# Patient Record
Sex: FEMALE | Race: BLACK OR AFRICAN AMERICAN | NOT HISPANIC OR LATINO | URBAN - METROPOLITAN AREA
[De-identification: names, ages, dates, MRNs, and addresses within clinical notes are randomized per-mention and may not be internally consistent; named-entity substitution may affect disease eponyms.]

---

## 2021-12-22 ENCOUNTER — WEB ENCOUNTER (OUTPATIENT)
Dept: URBAN - METROPOLITAN AREA CLINIC 115 | Facility: CLINIC | Age: 61
End: 2021-12-22

## 2021-12-22 ENCOUNTER — OFFICE VISIT (OUTPATIENT)
Dept: URBAN - METROPOLITAN AREA CLINIC 115 | Facility: CLINIC | Age: 61
End: 2021-12-22
Payer: COMMERCIAL

## 2021-12-22 ENCOUNTER — LAB OUTSIDE AN ENCOUNTER (OUTPATIENT)
Dept: URBAN - METROPOLITAN AREA CLINIC 115 | Facility: CLINIC | Age: 61
End: 2021-12-22

## 2021-12-22 DIAGNOSIS — R10.84 ABDOMINAL CRAMPING, GENERALIZED: ICD-10-CM

## 2021-12-22 DIAGNOSIS — R16.0 LIVER MASS, RIGHT LOBE: ICD-10-CM

## 2021-12-22 PROBLEM — 300332007: Status: ACTIVE | Noted: 2021-12-22

## 2021-12-22 PROCEDURE — 99203 OFFICE O/P NEW LOW 30 MIN: CPT | Performed by: INTERNAL MEDICINE

## 2021-12-22 PROCEDURE — 99243 OFF/OP CNSLTJ NEW/EST LOW 30: CPT | Performed by: INTERNAL MEDICINE

## 2021-12-22 NOTE — HPI-TODAY'S VISIT:
Patient is here accompanied by her  for evaluation of having right flank pain with abnormal CT scan showing lesion in the liver.  Patient stated she has been having off and on for years right flank pain however recently her pain was intense and hence went to the emergency room had a CT scan of the abdomen pelvis that showed 4.2 x 3.6 cm low attenuated lesion in the left lobe of the liver.  Patient denies any heartburn denies any current constipation.  Reports having colonoscopy about 4 years ago which was unremarkable.  Patient denies any chest pain shortness of breath.

## 2022-05-13 ENCOUNTER — LAB OUTSIDE AN ENCOUNTER (OUTPATIENT)
Dept: URBAN - METROPOLITAN AREA CLINIC 115 | Facility: CLINIC | Age: 62
End: 2022-05-13

## 2022-05-13 ENCOUNTER — OFFICE VISIT (OUTPATIENT)
Dept: URBAN - METROPOLITAN AREA CLINIC 115 | Facility: CLINIC | Age: 62
End: 2022-05-13
Payer: COMMERCIAL

## 2022-05-13 ENCOUNTER — WEB ENCOUNTER (OUTPATIENT)
Dept: URBAN - METROPOLITAN AREA CLINIC 115 | Facility: CLINIC | Age: 62
End: 2022-05-13

## 2022-05-13 DIAGNOSIS — R10.84 ABDOMINAL PAIN, GENERALIZED: ICD-10-CM

## 2022-05-13 DIAGNOSIS — R16.0 LIVER MASS, LEFT LOBE: ICD-10-CM

## 2022-05-13 DIAGNOSIS — K21.9 GASTROESOPHAGEAL REFLUX DISEASE, UNSPECIFIED WHETHER ESOPHAGITIS PRESENT: ICD-10-CM

## 2022-05-13 PROBLEM — 162050009: Status: ACTIVE | Noted: 2022-05-13

## 2022-05-13 PROBLEM — 235595009: Status: ACTIVE | Noted: 2022-05-13

## 2022-05-13 PROCEDURE — 99214 OFFICE O/P EST MOD 30 MIN: CPT | Performed by: INTERNAL MEDICINE

## 2022-05-13 NOTE — HPI-TODAY'S VISIT:
Patient is here  for evaluation of having right flank pain with abnormal CT scan showing lesion in the liver.  Patient stated she has been having off and on for years right flank pain however recently her pain was intense and hence went to the emergency room had a CT scan of the abdomen pelvis that showed 4.2 x 3.6 cm low attenuated lesion in the left lobe of the liver.  Patient denies any heartburn denies any current constipation.  Reports having colonoscopy about 4 years ago which was unremarkable.  Patient denies any chest pain shortness of breath.  5/13/22 : Ms. Hopson is a 61-year-old female came to the office with complaints of having right upper quadrant abdominal discomfort which is burning sometimes dull aching.  Patient was still concerned about the questionable mass that was seen on the previous CT scan however it was not seen on the MRI of the abdomen which was done in December 2021.  Patient stated her bowel movements are every other day she had discomfort is not related with food intake.  Denies any chest pain shortness of breath.  Last colonoscopy was in 2017.  Patient was concerned about having cancer

## 2022-05-14 LAB
A/G RATIO: 1.6
ALBUMIN: 4.5
ALKALINE PHOSPHATASE: 95
ALT (SGPT): 13
AST (SGOT): 19
BILIRUBIN, TOTAL: 0.8
BUN/CREATININE RATIO: 19
BUN: 15
CALCIUM: 9.8
CARBON DIOXIDE, TOTAL: 24
CHLORIDE: 103
CREATININE: 0.79
EGFR: 85
GLOBULIN, TOTAL: 2.9
GLUCOSE: 110
POTASSIUM: 4.3
PROTEIN, TOTAL: 7.4
SODIUM: 139

## 2022-06-15 ENCOUNTER — WEB ENCOUNTER (OUTPATIENT)
Dept: URBAN - METROPOLITAN AREA CLINIC 115 | Facility: CLINIC | Age: 62
End: 2022-06-15

## 2022-11-09 ENCOUNTER — DASHBOARD ENCOUNTERS (OUTPATIENT)
Age: 62
End: 2022-11-09

## 2022-11-17 ENCOUNTER — OFFICE VISIT (OUTPATIENT)
Dept: URBAN - METROPOLITAN AREA CLINIC 82 | Facility: CLINIC | Age: 62
End: 2022-11-17